# Patient Record
Sex: FEMALE | Race: WHITE | Employment: UNEMPLOYED | URBAN - METROPOLITAN AREA
[De-identification: names, ages, dates, MRNs, and addresses within clinical notes are randomized per-mention and may not be internally consistent; named-entity substitution may affect disease eponyms.]

---

## 2019-05-03 ENCOUNTER — HOSPITAL ENCOUNTER (OUTPATIENT)
Age: 59
Discharge: HOME | End: 2019-05-05
Payer: COMMERCIAL

## 2019-05-03 LAB
BACTERIA: ABNORMAL /HPF
BILIRUBIN URINE: NEGATIVE
BLOOD, URINE: ABNORMAL
CLARITY: CLEAR
COLOR: YELLOW
GLUCOSE URINE: NEGATIVE MG/DL
KETONES, URINE: NEGATIVE MG/DL
LEUKOCYTE ESTERASE, URINE: ABNORMAL
NITRITE, URINE: NEGATIVE
PH UA: 6 (ref 5–9)
PROTEIN UA: NEGATIVE MG/DL
RBC UA: ABNORMAL /HPF (ref 0–2)
SPECIFIC GRAVITY UA: 1.01 (ref 1–1.03)
UROBILINOGEN, URINE: 0.2 E.U./DL
WBC UA: ABNORMAL /HPF (ref 0–5)

## 2019-05-03 PROCEDURE — 87088 URINE BACTERIA CULTURE: CPT

## 2019-05-03 PROCEDURE — 81001 URINALYSIS AUTO W/SCOPE: CPT

## 2019-05-05 LAB — URINE CULTURE, ROUTINE: NORMAL

## 2020-01-27 ENCOUNTER — TELEPHONE (OUTPATIENT)
Dept: ADMINISTRATIVE | Age: 60
End: 2020-01-27

## 2021-03-03 DIAGNOSIS — Z01.818 PRE-OP TESTING: Primary | ICD-10-CM

## 2021-03-05 ENCOUNTER — HOSPITAL ENCOUNTER (OUTPATIENT)
Age: 61
Discharge: HOME | End: 2021-03-07
Payer: COMMERCIAL

## 2021-03-05 DIAGNOSIS — Z01.818 PRE-OP TESTING: ICD-10-CM

## 2021-03-05 PROCEDURE — U0003 INFECTIOUS AGENT DETECTION BY NUCLEIC ACID (DNA OR RNA); SEVERE ACUTE RESPIRATORY SYNDROME CORONAVIRUS 2 (SARS-COV-2) (CORONAVIRUS DISEASE [COVID-19]), AMPLIFIED PROBE TECHNIQUE, MAKING USE OF HIGH THROUGHPUT TECHNOLOGIES AS DESCRIBED BY CMS-2020-01-R: HCPCS

## 2021-03-07 LAB — SARS-COV-2, PCR: NOT DETECTED

## 2021-03-12 ENCOUNTER — HOSPITAL ENCOUNTER (OUTPATIENT)
Dept: ULTRASOUND IMAGING | Age: 61
Discharge: HOME | End: 2021-03-14
Payer: COMMERCIAL

## 2021-03-12 DIAGNOSIS — E04.1 CYST OF THYROID: ICD-10-CM

## 2021-03-12 PROCEDURE — 99212 OFFICE O/P EST SF 10 MIN: CPT | Performed by: RADIOLOGY

## 2021-03-12 PROCEDURE — 10005 FNA BX W/US GDN 1ST LES: CPT | Performed by: RADIOLOGY

## 2021-03-12 PROCEDURE — 88173 CYTOPATH EVAL FNA REPORT: CPT

## 2021-03-12 PROCEDURE — 10005 FNA BX W/US GDN 1ST LES: CPT

## 2021-03-12 PROCEDURE — 88305 TISSUE EXAM BY PATHOLOGIST: CPT

## 2021-03-12 NOTE — H&P
Interventional Radiology  Attending Pre-operative History and Physical    DIAGNOSIS:  There is no problem list on file for this patient. CHIEF COMPLAINT: <principal problem not specified>        No current outpatient medications on file. No Known Allergies    No past medical history on file. No past surgical history on file. No family history on file.     Social History     Socioeconomic History    Marital status:      Spouse name: Not on file    Number of children: Not on file    Years of education: Not on file    Highest education level: Not on file   Occupational History    Not on file   Social Needs    Financial resource strain: Not on file    Food insecurity     Worry: Not on file     Inability: Not on file    Transportation needs     Medical: Not on file     Non-medical: Not on file   Tobacco Use    Smoking status: Not on file   Substance and Sexual Activity    Alcohol use: Not on file    Drug use: Not on file    Sexual activity: Not on file   Lifestyle    Physical activity     Days per week: Not on file     Minutes per session: Not on file    Stress: Not on file   Relationships    Social connections     Talks on phone: Not on file     Gets together: Not on file     Attends Buddhist service: Not on file     Active member of club or organization: Not on file     Attends meetings of clubs or organizations: Not on file     Relationship status: Not on file    Intimate partner violence     Fear of current or ex partner: Not on file     Emotionally abused: Not on file     Physically abused: Not on file     Forced sexual activity: Not on file   Other Topics Concern    Not on file   Social History Narrative    Not on file       ROS: Non-contributory other than as noted above    PHYSICAL EXAM:      Heart and Lungs:  demonstrate no contraindications to proceed    DATA:  CBC: No results found for: WBC, RBC, HGB, HCT, MCV, MCH, MCHC, RDW, PLT, MPV  CBC with Differential:  No results found for: WBC, RBC, HGB, HCT, PLT, MCV, MCH, MCHC, RDW, NRBC, SEGSPCT, BANDSPCT, BLASTSPCT, METASPCT, LYMPHOPCT, PROMYELOPCT, MONOPCT, MYELOPCT, EOSPCT, BASOPCT, MONOSABS, LYMPHSABS, EOSABS, BASOSABS, DIFFTYPE  Platelets:  No results found for: PLT  BUN/Creatinine:  No results found for: BUN, CREATININE    ASSESSMENT AND PLAN:  1. Right thyroid mass plan for aspiration  2. Procedure options, risks and benefits reviewed with patient. Patient expresses understanding.     Electronically signed by Tal Reyna II, MD on 3/12/2021 at 1:48 PM

## 2021-03-12 NOTE — BRIEF OP NOTE
Brief Postoperative Note    Juliette Perez  YOB: 1960  [id number]    Pre-operative Diagnosis: mass   Right thyroid mass plan for aspiration  Post-operative Diagnosis: Same    Procedure: biopsy    Estimated Blood Loss: < 10 cc    Surgeon: Festus BENTON     Complications: none    Specimens obtained: Yes, biopsy of mass    Findings: biopsy of a mass      Tal Reyna II   3/12/2021 1:48 PM

## 2021-04-02 ENCOUNTER — HOSPITAL ENCOUNTER (OUTPATIENT)
Dept: MRI IMAGING | Age: 61
Discharge: HOME | End: 2021-04-04
Payer: COMMERCIAL

## 2021-04-02 DIAGNOSIS — R22.1 NECK MASS: ICD-10-CM

## 2021-04-02 PROCEDURE — 6360000004 HC RX CONTRAST MEDICATION: Performed by: RADIOLOGY

## 2021-04-02 PROCEDURE — A9579 GAD-BASE MR CONTRAST NOS,1ML: HCPCS | Performed by: RADIOLOGY

## 2021-04-02 PROCEDURE — 70543 MRI ORBT/FAC/NCK W/O &W/DYE: CPT

## 2021-04-02 RX ADMIN — GADOTERIDOL 12 ML: 279.3 INJECTION, SOLUTION INTRAVENOUS at 18:01

## 2021-05-24 NOTE — PROGRESS NOTES
Have you been tested for COVID  No       vaccinated    Have you been told you were positive for COVID No  Have you had any known exposure to someone that is positive for COVID No  Do you have a cough                   No              Do you have shortness of breath No                 Do you have a sore throat            No                Are you having chills                    No                Are you having muscle aches. No                    Please come to the hospital wearing a mask and have your significant other wear a mask as well. Both of you should check your temperature before leaving to come here,  if it is 100 or higher please call [phone] for instruction.

## 2021-05-24 NOTE — PROGRESS NOTES
[facility] PRE-ADMISSION TESTING INSTRUCTIONS    Please come to the hospital wearing a mask and have your significant other wear a mask as well. Both of you should check your temperature before leaving to come here,  if it is 100 or higher please call [phone], preop area opens at 0530 a.m.,  for instruction. The Preadmission Testing patient is instructed accordingly using the following criteria (check applicable):    ARRIVAL INSTRUCTIONS:  [x] Parking the day of Surgery is located in the Main Entrance lot. Upon entering the door, make an immediate right to the surgery reception desk    [x] Bring photo ID and insurance card    [] Bring in a copy of Living will or Durable Power of  papers.     [x] Please be sure to arrange for responsible adult to provide transportation to and from the hospital    [x] Please arrange for responsible adult to be with you for the 24 hour period post procedure due to having anesthesia      GENERAL INSTRUCTIONS:    [x] Nothing by mouth after midnight, including gum, candy, mints or water    [x] You may brush your teeth, but do not swallow any water    [] Take medications as instructed with 1-2 oz of water    [] Stop herbal supplements and vitamins 5 days prior to procedure    [] Follow preop dosing of blood thinners per physician instructions    [] Take 1/2 dose of evening insulin, but no insulin after midnight    [] No oral diabetic medications after midnight    [] If diabetic and have low blood sugar or feel symptomatic, take 1-2oz apple juice only    [] Bring inhalers day of surgery    [] Bring C-PAP/ Bi-Pap day of surgery    [] Bring urine specimen day of surgery    [x] Shower or bath with soap, lather and rinse well, AM of Surgery, no lotion, powders or creams to surgical site    [] Follow bowel prep as instructed per surgeon    [x] No tobacco products within 24 hours of surgery     [x] No alcohol or illegal drug use within 24 hours of surgery.     [x] Jewelry, body piercing's, eyeglasses, contact lenses and dentures are not permitted into surgery (bring cases)      [x] Please do not wear any nail polish, make up or hair products on the day of surgery    [x] You can expect a call the business day prior to procedure to notify you if your arrival time changes    [x] If you receive a survey after surgery we would greatly appreciate your comments    [] Parent/guardian of a minor must accompany their child and remain on the premises  the entire time they are under our care     [] Pediatric patients may bring favorite toy, blanket or comfort item with them    [] A caregiver or family member must remain with the patient during their stay if they are mentally handicapped, have dementia, disoriented or unable to use a call light or would be a safety concern if left unattended    [x] Please notify surgeon if you develop any illness between now and time of surgery (cold, cough, sore throat, fever, nausea, vomiting) or any signs of infections  including skin, wounds, and dental.    [x]  The Outpatient Pharmacy is available to fill your prescription here on your day of surgery, ask your preop nurse for details    [] Other instructions    EDUCATIONAL MATERIALS PROVIDED:    [] PAT Preoperative Education Packet/Booklet     [] Medication List    [] Transfusion bracelet applied with instructions    [] Shower with soap, lather and rinse well, and use CHG wipes provided the evening before surgery as instructed    [] Incentive spirometer with instructions

## 2021-05-26 ENCOUNTER — ANESTHESIA EVENT (OUTPATIENT)
Dept: OPERATING ROOM | Age: 61
End: 2021-05-26
Payer: COMMERCIAL

## 2021-05-26 ENCOUNTER — HOSPITAL ENCOUNTER (OUTPATIENT)
Dept: PREADMISSION TESTING | Age: 61
Discharge: HOME | End: 2021-05-26
Payer: COMMERCIAL

## 2021-05-26 LAB
ABO/RH: NORMAL
ALBUMIN SERPL-MCNC: 4.3 G/DL (ref 3.5–5.2)
ALP BLD-CCNC: 87 U/L (ref 35–104)
ALT SERPL-CCNC: 24 U/L (ref 0–32)
ANION GAP SERPL CALCULATED.3IONS-SCNC: 6 MMOL/L (ref 7–16)
ANTIBODY SCREEN: NORMAL
AST SERPL-CCNC: 26 U/L (ref 0–31)
BASOPHILS ABSOLUTE: 0.04 E9/L (ref 0–0.2)
BASOPHILS RELATIVE PERCENT: 0.8 % (ref 0–2)
BILIRUB SERPL-MCNC: 0.6 MG/DL (ref 0–1.2)
BUN BLDV-MCNC: 16 MG/DL (ref 6–23)
CALCIUM SERPL-MCNC: 8.7 MG/DL (ref 8.6–10.2)
CHLORIDE BLD-SCNC: 105 MMOL/L (ref 98–107)
CO2: 29 MMOL/L (ref 22–29)
CREAT SERPL-MCNC: 0.8 MG/DL (ref 0.5–1)
EKG ATRIAL RATE: 55 BPM
EKG P AXIS: 50 DEGREES
EKG P-R INTERVAL: 164 MS
EKG Q-T INTERVAL: 390 MS
EKG QRS DURATION: 80 MS
EKG QTC CALCULATION (BAZETT): 373 MS
EKG R AXIS: 55 DEGREES
EKG T AXIS: 46 DEGREES
EKG VENTRICULAR RATE: 55 BPM
EOSINOPHILS ABSOLUTE: 0.14 E9/L (ref 0.05–0.5)
EOSINOPHILS RELATIVE PERCENT: 2.8 % (ref 0–6)
GFR AFRICAN AMERICAN: >60
GFR NON-AFRICAN AMERICAN: >60 ML/MIN/1.73
GLUCOSE BLD-MCNC: 108 MG/DL (ref 74–99)
HCT VFR BLD CALC: 39.1 % (ref 34–48)
HEMOGLOBIN: 13.5 G/DL (ref 11.5–15.5)
IMMATURE GRANULOCYTES #: 0.02 E9/L
IMMATURE GRANULOCYTES %: 0.4 % (ref 0–5)
LYMPHOCYTES ABSOLUTE: 0.96 E9/L (ref 1.5–4)
LYMPHOCYTES RELATIVE PERCENT: 19.5 % (ref 20–42)
MCH RBC QN AUTO: 32.4 PG (ref 26–35)
MCHC RBC AUTO-ENTMCNC: 34.5 % (ref 32–34.5)
MCV RBC AUTO: 93.8 FL (ref 80–99.9)
MONOCYTES ABSOLUTE: 0.46 E9/L (ref 0.1–0.95)
MONOCYTES RELATIVE PERCENT: 9.3 % (ref 2–12)
NEUTROPHILS ABSOLUTE: 3.31 E9/L (ref 1.8–7.3)
NEUTROPHILS RELATIVE PERCENT: 67.2 % (ref 43–80)
PDW BLD-RTO: 12.3 FL (ref 11.5–15)
PLATELET # BLD: 214 E9/L (ref 130–450)
PMV BLD AUTO: 9 FL (ref 7–12)
POTASSIUM REFLEX MAGNESIUM: 4.5 MMOL/L (ref 3.5–5)
RBC # BLD: 4.17 E12/L (ref 3.5–5.5)
SODIUM BLD-SCNC: 140 MMOL/L (ref 132–146)
TOTAL PROTEIN: 6.2 G/DL (ref 6.4–8.3)
WBC # BLD: 4.9 E9/L (ref 4.5–11.5)

## 2021-05-26 PROCEDURE — 86850 RBC ANTIBODY SCREEN: CPT

## 2021-05-26 PROCEDURE — 36415 COLL VENOUS BLD VENIPUNCTURE: CPT

## 2021-05-26 PROCEDURE — 85025 COMPLETE CBC W/AUTO DIFF WBC: CPT

## 2021-05-26 PROCEDURE — 86900 BLOOD TYPING SEROLOGIC ABO: CPT

## 2021-05-26 PROCEDURE — 80053 COMPREHEN METABOLIC PANEL: CPT

## 2021-05-26 PROCEDURE — 86901 BLOOD TYPING SEROLOGIC RH(D): CPT

## 2021-05-26 PROCEDURE — 93005 ELECTROCARDIOGRAM TRACING: CPT

## 2021-05-27 ENCOUNTER — ANESTHESIA (OUTPATIENT)
Dept: OPERATING ROOM | Age: 61
End: 2021-05-27
Payer: COMMERCIAL

## 2021-05-27 ENCOUNTER — HOSPITAL ENCOUNTER (OUTPATIENT)
Age: 61
Setting detail: OBSERVATION
Discharge: HOME HEALTH | End: 2021-05-28
Attending: OTOLARYNGOLOGY | Admitting: OTOLARYNGOLOGY
Payer: COMMERCIAL

## 2021-05-27 VITALS — DIASTOLIC BLOOD PRESSURE: 99 MMHG | SYSTOLIC BLOOD PRESSURE: 184 MMHG | TEMPERATURE: 94.1 F | OXYGEN SATURATION: 100 %

## 2021-05-27 DIAGNOSIS — E89.0 S/P PARTIAL THYROIDECTOMY: Primary | ICD-10-CM

## 2021-05-27 LAB
CALCIUM IONIZED: 1.22 MMOL/L (ref 1.15–1.33)
CALCIUM IONIZED: 1.25 MMOL/L (ref 1.15–1.33)
CALCIUM SERPL-MCNC: 8.1 MG/DL (ref 8.6–10.2)
CALCIUM SERPL-MCNC: 8.4 MG/DL (ref 8.6–10.2)
PTH, INTRAOPERATIVE: 39.6 PG/ML (ref 15–65)

## 2021-05-27 PROCEDURE — 7100000001 HC PACU RECOVERY - ADDTL 15 MIN: Performed by: OTOLARYNGOLOGY

## 2021-05-27 PROCEDURE — 83970 ASSAY OF PARATHORMONE: CPT

## 2021-05-27 PROCEDURE — G0378 HOSPITAL OBSERVATION PER HR: HCPCS

## 2021-05-27 PROCEDURE — 6360000002 HC RX W HCPCS: Performed by: ANESTHESIOLOGY

## 2021-05-27 PROCEDURE — 3600000013 HC SURGERY LEVEL 3 ADDTL 15MIN: Performed by: OTOLARYNGOLOGY

## 2021-05-27 PROCEDURE — 6360000002 HC RX W HCPCS: Performed by: OTOLARYNGOLOGY

## 2021-05-27 PROCEDURE — 2580000003 HC RX 258: Performed by: NURSE ANESTHETIST, CERTIFIED REGISTERED

## 2021-05-27 PROCEDURE — 6360000002 HC RX W HCPCS: Performed by: NURSE ANESTHETIST, CERTIFIED REGISTERED

## 2021-05-27 PROCEDURE — 3700000000 HC ANESTHESIA ATTENDED CARE: Performed by: OTOLARYNGOLOGY

## 2021-05-27 PROCEDURE — 2500000003 HC RX 250 WO HCPCS: Performed by: OTOLARYNGOLOGY

## 2021-05-27 PROCEDURE — 88307 TISSUE EXAM BY PATHOLOGIST: CPT

## 2021-05-27 PROCEDURE — 36415 COLL VENOUS BLD VENIPUNCTURE: CPT

## 2021-05-27 PROCEDURE — 2580000003 HC RX 258: Performed by: OTOLARYNGOLOGY

## 2021-05-27 PROCEDURE — 6360000002 HC RX W HCPCS: Performed by: STUDENT IN AN ORGANIZED HEALTH CARE EDUCATION/TRAINING PROGRAM

## 2021-05-27 PROCEDURE — 6370000000 HC RX 637 (ALT 250 FOR IP): Performed by: STUDENT IN AN ORGANIZED HEALTH CARE EDUCATION/TRAINING PROGRAM

## 2021-05-27 PROCEDURE — 2500000003 HC RX 250 WO HCPCS: Performed by: NURSE ANESTHETIST, CERTIFIED REGISTERED

## 2021-05-27 PROCEDURE — 2720000010 HC SURG SUPPLY STERILE: Performed by: OTOLARYNGOLOGY

## 2021-05-27 PROCEDURE — 3700000001 HC ADD 15 MINUTES (ANESTHESIA): Performed by: OTOLARYNGOLOGY

## 2021-05-27 PROCEDURE — 2580000003 HC RX 258: Performed by: STUDENT IN AN ORGANIZED HEALTH CARE EDUCATION/TRAINING PROGRAM

## 2021-05-27 PROCEDURE — 2709999900 HC NON-CHARGEABLE SUPPLY: Performed by: OTOLARYNGOLOGY

## 2021-05-27 PROCEDURE — 82330 ASSAY OF CALCIUM: CPT

## 2021-05-27 PROCEDURE — 7100000000 HC PACU RECOVERY - FIRST 15 MIN: Performed by: OTOLARYNGOLOGY

## 2021-05-27 PROCEDURE — 88304 TISSUE EXAM BY PATHOLOGIST: CPT

## 2021-05-27 PROCEDURE — 3600000003 HC SURGERY LEVEL 3 BASE: Performed by: OTOLARYNGOLOGY

## 2021-05-27 PROCEDURE — 88331 PATH CONSLTJ SURG 1 BLK 1SPC: CPT

## 2021-05-27 PROCEDURE — 82310 ASSAY OF CALCIUM: CPT

## 2021-05-27 RX ORDER — MEPERIDINE HYDROCHLORIDE 50 MG/ML
50 INJECTION INTRAMUSCULAR; INTRAVENOUS; SUBCUTANEOUS ONCE
Status: COMPLETED | OUTPATIENT
Start: 2021-05-27 | End: 2021-05-27

## 2021-05-27 RX ORDER — SODIUM CHLORIDE 9 MG/ML
25 INJECTION, SOLUTION INTRAVENOUS PRN
Status: DISCONTINUED | OUTPATIENT
Start: 2021-05-27 | End: 2021-05-27

## 2021-05-27 RX ORDER — LIDOCAINE HYDROCHLORIDE AND EPINEPHRINE 10; 10 MG/ML; UG/ML
INJECTION, SOLUTION INFILTRATION; PERINEURAL PRN
Status: DISCONTINUED | OUTPATIENT
Start: 2021-05-27 | End: 2021-05-27 | Stop reason: HOSPADM

## 2021-05-27 RX ORDER — PROMETHAZINE HYDROCHLORIDE 25 MG/1
12.5 TABLET ORAL EVERY 6 HOURS PRN
Status: DISCONTINUED | OUTPATIENT
Start: 2021-05-27 | End: 2021-05-28 | Stop reason: HOSPADM

## 2021-05-27 RX ORDER — SODIUM CHLORIDE 0.9 % (FLUSH) 0.9 %
5-40 SYRINGE (ML) INJECTION EVERY 12 HOURS SCHEDULED
Status: DISCONTINUED | OUTPATIENT
Start: 2021-05-27 | End: 2021-05-27

## 2021-05-27 RX ORDER — PROPOFOL 10 MG/ML
INJECTION, EMULSION INTRAVENOUS PRN
Status: DISCONTINUED | OUTPATIENT
Start: 2021-05-27 | End: 2021-05-27 | Stop reason: SDUPTHER

## 2021-05-27 RX ORDER — MEPERIDINE HYDROCHLORIDE 50 MG/ML
50 INJECTION INTRAMUSCULAR; INTRAVENOUS; SUBCUTANEOUS ONCE
Status: DISCONTINUED | OUTPATIENT
Start: 2021-05-27 | End: 2021-05-27

## 2021-05-27 RX ORDER — NALOXONE HYDROCHLORIDE 0.4 MG/ML
INJECTION, SOLUTION INTRAMUSCULAR; INTRAVENOUS; SUBCUTANEOUS PRN
Status: DISCONTINUED | OUTPATIENT
Start: 2021-05-27 | End: 2021-05-27 | Stop reason: SDUPTHER

## 2021-05-27 RX ORDER — SODIUM CHLORIDE 0.9 % (FLUSH) 0.9 %
5-40 SYRINGE (ML) INJECTION PRN
Status: DISCONTINUED | OUTPATIENT
Start: 2021-05-27 | End: 2021-05-28 | Stop reason: HOSPADM

## 2021-05-27 RX ORDER — ONDANSETRON 2 MG/ML
4 INJECTION INTRAMUSCULAR; INTRAVENOUS EVERY 6 HOURS PRN
Status: DISCONTINUED | OUTPATIENT
Start: 2021-05-27 | End: 2021-05-28 | Stop reason: HOSPADM

## 2021-05-27 RX ORDER — MIDAZOLAM HYDROCHLORIDE 1 MG/ML
INJECTION INTRAMUSCULAR; INTRAVENOUS PRN
Status: DISCONTINUED | OUTPATIENT
Start: 2021-05-27 | End: 2021-05-27 | Stop reason: SDUPTHER

## 2021-05-27 RX ORDER — PHENYLEPHRINE HCL IN 0.9% NACL 1 MG/10 ML
SYRINGE (ML) INTRAVENOUS PRN
Status: DISCONTINUED | OUTPATIENT
Start: 2021-05-27 | End: 2021-05-27 | Stop reason: SDUPTHER

## 2021-05-27 RX ORDER — SODIUM CHLORIDE 0.9 % (FLUSH) 0.9 %
5-40 SYRINGE (ML) INJECTION EVERY 12 HOURS SCHEDULED
Status: DISCONTINUED | OUTPATIENT
Start: 2021-05-27 | End: 2021-05-28 | Stop reason: HOSPADM

## 2021-05-27 RX ORDER — DEXAMETHASONE SODIUM PHOSPHATE 4 MG/ML
INJECTION, SOLUTION INTRA-ARTICULAR; INTRALESIONAL; INTRAMUSCULAR; INTRAVENOUS; SOFT TISSUE PRN
Status: DISCONTINUED | OUTPATIENT
Start: 2021-05-27 | End: 2021-05-27 | Stop reason: SDUPTHER

## 2021-05-27 RX ORDER — ACETAMINOPHEN 325 MG/1
650 TABLET ORAL EVERY 6 HOURS
Status: DISCONTINUED | OUTPATIENT
Start: 2021-05-27 | End: 2021-05-28 | Stop reason: HOSPADM

## 2021-05-27 RX ORDER — LIDOCAINE HYDROCHLORIDE 20 MG/ML
INJECTION, SOLUTION EPIDURAL; INFILTRATION; INTRACAUDAL; PERINEURAL PRN
Status: DISCONTINUED | OUTPATIENT
Start: 2021-05-27 | End: 2021-05-27 | Stop reason: SDUPTHER

## 2021-05-27 RX ORDER — OXYCODONE HYDROCHLORIDE 5 MG/1
10 TABLET ORAL EVERY 4 HOURS PRN
Status: DISCONTINUED | OUTPATIENT
Start: 2021-05-27 | End: 2021-05-28 | Stop reason: HOSPADM

## 2021-05-27 RX ORDER — FENTANYL CITRATE 50 UG/ML
INJECTION, SOLUTION INTRAMUSCULAR; INTRAVENOUS PRN
Status: DISCONTINUED | OUTPATIENT
Start: 2021-05-27 | End: 2021-05-27 | Stop reason: SDUPTHER

## 2021-05-27 RX ORDER — OXYCODONE HYDROCHLORIDE 5 MG/1
5 TABLET ORAL EVERY 4 HOURS PRN
Status: DISCONTINUED | OUTPATIENT
Start: 2021-05-27 | End: 2021-05-28 | Stop reason: HOSPADM

## 2021-05-27 RX ORDER — SODIUM CHLORIDE, SODIUM LACTATE, POTASSIUM CHLORIDE, CALCIUM CHLORIDE 600; 310; 30; 20 MG/100ML; MG/100ML; MG/100ML; MG/100ML
INJECTION, SOLUTION INTRAVENOUS CONTINUOUS PRN
Status: DISCONTINUED | OUTPATIENT
Start: 2021-05-27 | End: 2021-05-27 | Stop reason: SDUPTHER

## 2021-05-27 RX ORDER — SUCCINYLCHOLINE/SOD CL,ISO/PF 200MG/10ML
SYRINGE (ML) INTRAVENOUS PRN
Status: DISCONTINUED | OUTPATIENT
Start: 2021-05-27 | End: 2021-05-27 | Stop reason: SDUPTHER

## 2021-05-27 RX ORDER — SODIUM CHLORIDE 9 MG/ML
25 INJECTION, SOLUTION INTRAVENOUS PRN
Status: DISCONTINUED | OUTPATIENT
Start: 2021-05-27 | End: 2021-05-28 | Stop reason: HOSPADM

## 2021-05-27 RX ORDER — MEPERIDINE HYDROCHLORIDE 25 MG/ML
12.5 INJECTION INTRAMUSCULAR; INTRAVENOUS; SUBCUTANEOUS EVERY 5 MIN PRN
Status: DISCONTINUED | OUTPATIENT
Start: 2021-05-27 | End: 2021-05-27

## 2021-05-27 RX ORDER — SODIUM CHLORIDE 0.9 % (FLUSH) 0.9 %
5-40 SYRINGE (ML) INJECTION PRN
Status: DISCONTINUED | OUTPATIENT
Start: 2021-05-27 | End: 2021-05-27

## 2021-05-27 RX ORDER — ONDANSETRON 2 MG/ML
INJECTION INTRAMUSCULAR; INTRAVENOUS PRN
Status: DISCONTINUED | OUTPATIENT
Start: 2021-05-27 | End: 2021-05-27 | Stop reason: SDUPTHER

## 2021-05-27 RX ORDER — ONDANSETRON 2 MG/ML
4 INJECTION INTRAMUSCULAR; INTRAVENOUS
Status: DISCONTINUED | OUTPATIENT
Start: 2021-05-27 | End: 2021-05-27

## 2021-05-27 RX ORDER — SODIUM CHLORIDE, SODIUM LACTATE, POTASSIUM CHLORIDE, CALCIUM CHLORIDE 600; 310; 30; 20 MG/100ML; MG/100ML; MG/100ML; MG/100ML
INJECTION, SOLUTION INTRAVENOUS CONTINUOUS
Status: DISCONTINUED | OUTPATIENT
Start: 2021-05-27 | End: 2021-05-27

## 2021-05-27 RX ADMIN — NALOXONE HYDROCHLORIDE 0.12 MG: 0.4 INJECTION, SOLUTION INTRAMUSCULAR; INTRAVENOUS; SUBCUTANEOUS at 10:31

## 2021-05-27 RX ADMIN — FENTANYL CITRATE 50 MCG: 50 INJECTION, SOLUTION INTRAMUSCULAR; INTRAVENOUS at 08:38

## 2021-05-27 RX ADMIN — ONDANSETRON 4 MG: 2 INJECTION INTRAMUSCULAR; INTRAVENOUS at 10:22

## 2021-05-27 RX ADMIN — ACETAMINOPHEN 650 MG: 325 TABLET ORAL at 14:05

## 2021-05-27 RX ADMIN — MEPERIDINE HYDROCHLORIDE 50 MG: 50 INJECTION, SOLUTION INTRAMUSCULAR; INTRAVENOUS; SUBCUTANEOUS at 11:20

## 2021-05-27 RX ADMIN — Medication 100 MCG: at 08:46

## 2021-05-27 RX ADMIN — HYDROMORPHONE HYDROCHLORIDE 0.5 MG: 1 INJECTION, SOLUTION INTRAMUSCULAR; INTRAVENOUS; SUBCUTANEOUS at 10:52

## 2021-05-27 RX ADMIN — SODIUM CHLORIDE, PRESERVATIVE FREE 10 ML: 5 INJECTION INTRAVENOUS at 20:12

## 2021-05-27 RX ADMIN — MIDAZOLAM 2 MG: 1 INJECTION INTRAMUSCULAR; INTRAVENOUS at 08:24

## 2021-05-27 RX ADMIN — PROPOFOL 200 MG: 10 INJECTION, EMULSION INTRAVENOUS at 08:26

## 2021-05-27 RX ADMIN — HYDROMORPHONE HYDROCHLORIDE 0.5 MG: 1 INJECTION, SOLUTION INTRAMUSCULAR; INTRAVENOUS; SUBCUTANEOUS at 11:00

## 2021-05-27 RX ADMIN — Medication 2000 MG: at 15:24

## 2021-05-27 RX ADMIN — DEXAMETHASONE SODIUM PHOSPHATE 8 MG: 4 INJECTION, SOLUTION INTRAMUSCULAR; INTRAVENOUS at 08:32

## 2021-05-27 RX ADMIN — Medication 100 MCG: at 08:44

## 2021-05-27 RX ADMIN — FENTANYL CITRATE 50 MCG: 50 INJECTION, SOLUTION INTRAMUSCULAR; INTRAVENOUS at 09:26

## 2021-05-27 RX ADMIN — ACETAMINOPHEN 650 MG: 325 TABLET ORAL at 18:03

## 2021-05-27 RX ADMIN — Medication 2000 MG: at 08:37

## 2021-05-27 RX ADMIN — SODIUM CHLORIDE, POTASSIUM CHLORIDE, SODIUM LACTATE AND CALCIUM CHLORIDE: 600; 310; 30; 20 INJECTION, SOLUTION INTRAVENOUS at 09:17

## 2021-05-27 RX ADMIN — SODIUM CHLORIDE, POTASSIUM CHLORIDE, SODIUM LACTATE AND CALCIUM CHLORIDE: 600; 310; 30; 20 INJECTION, SOLUTION INTRAVENOUS at 08:22

## 2021-05-27 RX ADMIN — LIDOCAINE HYDROCHLORIDE 100 MG: 20 INJECTION, SOLUTION EPIDURAL; INFILTRATION; INTRACAUDAL; PERINEURAL at 08:26

## 2021-05-27 RX ADMIN — FENTANYL CITRATE 100 MCG: 50 INJECTION, SOLUTION INTRAMUSCULAR; INTRAVENOUS at 08:26

## 2021-05-27 RX ADMIN — Medication 100 MG: at 08:26

## 2021-05-27 RX ADMIN — SODIUM CHLORIDE, POTASSIUM CHLORIDE, SODIUM LACTATE AND CALCIUM CHLORIDE: 600; 310; 30; 20 INJECTION, SOLUTION INTRAVENOUS at 10:49

## 2021-05-27 RX ADMIN — Medication 200 MCG: at 08:50

## 2021-05-27 ASSESSMENT — PULMONARY FUNCTION TESTS
PIF_VALUE: 14
PIF_VALUE: 30
PIF_VALUE: 15
PIF_VALUE: 16
PIF_VALUE: 15
PIF_VALUE: 16
PIF_VALUE: 11
PIF_VALUE: 15
PIF_VALUE: 10
PIF_VALUE: 15
PIF_VALUE: 18
PIF_VALUE: 13
PIF_VALUE: 15
PIF_VALUE: 10
PIF_VALUE: 1
PIF_VALUE: 1
PIF_VALUE: 13
PIF_VALUE: 14
PIF_VALUE: 0
PIF_VALUE: 19
PIF_VALUE: 14
PIF_VALUE: 15
PIF_VALUE: 15
PIF_VALUE: 14
PIF_VALUE: 13
PIF_VALUE: 14
PIF_VALUE: 4
PIF_VALUE: 14
PIF_VALUE: 4
PIF_VALUE: 10
PIF_VALUE: 30
PIF_VALUE: 14
PIF_VALUE: 14
PIF_VALUE: 13
PIF_VALUE: 13
PIF_VALUE: 14
PIF_VALUE: 17
PIF_VALUE: 14
PIF_VALUE: 15
PIF_VALUE: 16
PIF_VALUE: 14
PIF_VALUE: 14
PIF_VALUE: 16
PIF_VALUE: 15
PIF_VALUE: 14
PIF_VALUE: 14
PIF_VALUE: 16
PIF_VALUE: 13
PIF_VALUE: 14
PIF_VALUE: 13
PIF_VALUE: 15
PIF_VALUE: 15
PIF_VALUE: 14
PIF_VALUE: 13
PIF_VALUE: 15
PIF_VALUE: 16
PIF_VALUE: 14
PIF_VALUE: 15
PIF_VALUE: 14
PIF_VALUE: 15
PIF_VALUE: 18
PIF_VALUE: 15
PIF_VALUE: 16
PIF_VALUE: 14
PIF_VALUE: 14
PIF_VALUE: 15
PIF_VALUE: 16

## 2021-05-27 ASSESSMENT — PAIN DESCRIPTION - PAIN TYPE
TYPE: SURGICAL PAIN

## 2021-05-27 ASSESSMENT — PAIN SCALES - GENERAL
PAINLEVEL_OUTOF10: 2
PAINLEVEL_OUTOF10: 8
PAINLEVEL_OUTOF10: 1
PAINLEVEL_OUTOF10: 7
PAINLEVEL_OUTOF10: 4
PAINLEVEL_OUTOF10: 6
PAINLEVEL_OUTOF10: 1
PAINLEVEL_OUTOF10: 5
PAINLEVEL_OUTOF10: 5
PAINLEVEL_OUTOF10: 6
PAINLEVEL_OUTOF10: 8

## 2021-05-27 ASSESSMENT — PAIN DESCRIPTION - LOCATION
LOCATION: NECK

## 2021-05-27 ASSESSMENT — PAIN - FUNCTIONAL ASSESSMENT: PAIN_FUNCTIONAL_ASSESSMENT: 0-10

## 2021-05-27 NOTE — H&P
H&P reviewed, no changes. No issues. Questions and concerns were answered to the patient's satisfaction.  Will proceed with procedure    Will discuss with attending     Electronically signed by Presley Paget, DO on 5/27/21 at 6:52 AM EDT

## 2021-05-27 NOTE — PROGRESS NOTES
INTRAOPERATIVE CONSULTATION (with FROZEN SECTION)    A. 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 Defer to permanent for subtyping. B. Right neck cyst, representative sections for frozen section:  Scar tissue (possible collapsed cyst wall) containing benign-appearing parathyroid tissue.

## 2021-05-27 NOTE — H&P
The H&P has been reviewed, the patient has been examined, and I concur with the findings of the 4/30/2021 H&P.

## 2021-05-27 NOTE — PLAN OF CARE
Problem: Pain:  Goal: Pain level will decrease  Description: Pain level will decrease  Outcome: Ongoing  Goal: Control of acute pain  Description: Control of acute pain  Outcome: Ongoing  Goal: Control of chronic pain  Description: Control of chronic pain  Outcome: Ongoing     Problem: Discharge Planning:  Goal: Participates in care planning  Description: Participates in care planning  Outcome: Ongoing  Goal: Discharged to appropriate level of care  Description: Discharged to appropriate level of care  Outcome: Ongoing     Problem: Activity Intolerance:  Goal: Ability to tolerate increased activity will improve  Description: Ability to tolerate increased activity will improve  Outcome: Ongoing     Problem: Anxiety/Stress:  Goal: Level of anxiety will decrease  Description: Level of anxiety will decrease  Outcome: Ongoing     Problem:  Bowel Function - Altered:  Goal: Bowel elimination is within specified parameters  Description: Bowel elimination is within specified parameters  Outcome: Ongoing     Problem: Fluid Volume - Deficit:  Goal: Absence of fluid volume deficit signs and symptoms  Description: Absence of fluid volume deficit signs and symptoms  Outcome: Ongoing  Goal: Electrolytes within specified parameters  Description: Electrolytes within specified parameters  Outcome: Ongoing     Problem: Mental Status - Impaired:  Goal: Absence of continued neurological deterioration signs and symptoms  Description: Absence of continued neurological deterioration signs and symptoms  Outcome: Ongoing  Goal: Absence of physical injury  Description: Absence of physical injury  Outcome: Ongoing  Goal: Mental status will be restored to baseline  Description: Mental status will be restored to baseline  Outcome: Ongoing     Problem: Mobility - Impaired:  Goal: Mobility will improve to maximum level  Description: Mobility will improve to maximum level  Outcome: Ongoing     Problem: Nutrition Deficit:  Goal: Ability to achieve

## 2021-05-27 NOTE — ANESTHESIA POSTPROCEDURE EVALUATION
Department of Anesthesiology  Postprocedure Note    Patient: Josselyn Holder  MRN: [id number]  YOB: 1960  Date of evaluation: 5/27/2021  Time:  5:35 PM     Procedure Summary     Date: 05/27/21 Room / Location: [facility] 01 / 106 [facility]    Anesthesia Start: 3153 Anesthesia Stop: 4304    Procedure: RIGHT SUBTOTAL THYROIDECTOMY (N/A Neck) Diagnosis: (THYROID NODULE)    Surgeons: Sophy Bull MD Responsible Provider: Elieser Aceves MD    Anesthesia Type: general ASA Status: 2          Anesthesia Type: general    Jarett Phase I: Jarett Score: 10    Jarett Phase II:      Last vitals: Reviewed and per EMR flowsheets.        Anesthesia Post Evaluation    Patient location during evaluation: PACU  Patient participation: complete - patient participated  Level of consciousness: awake and alert  Airway patency: patent  Nausea & Vomiting: no nausea and no vomiting  Complications: no  Cardiovascular status: hemodynamically stable  Respiratory status: acceptable  Hydration status: stable

## 2021-05-27 NOTE — ANESTHESIA PRE PROCEDURE
Department of Anesthesiology  Preprocedure Note       Name:  Jeannie Mac   Age:  64 y.o.  :  1960                                          MRN:  [id number]         Date:  2021      Surgeon: Carloz Holt):  Alexia Briceno MD    Procedure: Procedure(s):  RIGHT SUBTOTAL THYROIDECTOMY POSSIBLE TOTAL THYROIDECTOMY    ++NEEDS NERVE INTEGRITY MONITOR++    Medications prior to admission:   Prior to Admission medications    Not on File       Current medications:    Current Facility-Administered Medications   Medication Dose Route Frequency Provider Last Rate Last Admin    lactated ringers infusion   Intravenous Continuous Alexia Briceno MD        sodium chloride flush 0.9 % injection 5-40 mL  5-40 mL Intravenous 2 times per day Alexia Briceno MD        sodium chloride flush 0.9 % injection 5-40 mL  5-40 mL Intravenous PRN Alexia Briceno MD        0.9 % sodium chloride infusion  25 mL Intravenous PRN Alexia Briceno MD        ceFAZolin (ANCEF) 2000 mg in sterile water 20 mL IV syringe  2,000 mg Intravenous On Call to [address] [facility] [address] [place]           Allergies:  No Known Allergies    Problem List:  There is no problem list on file for this patient.       Past Medical History:        Diagnosis Date    Abnormal thyroid biopsy     History of diarrhea     Ovarian cancer ([facility] [address]     tx with chemo and radiation    Wears contact lenses     Wears glasses     Wears partial dentures     upper and lower       Past Surgical History:        Procedure Laterality Date    TONSILLECTOMY         Social History:    Social History     Tobacco Use    Smoking status: Former Smoker     Years: 5.00     Types: Cigarettes    Smokeless tobacco: Never Used   Substance Use Topics    Alcohol use: Yes     Comment: occassionally wine                                Counseling given: Not Answered      Vital Signs (Current):   Vitals:    21 1546 21 0651   BP:  (!) 152/72   Pulse:  63 Resp:  18   SpO2:  98%   Weight: 130 lb (59 kg) 130 lb (59 kg)   Height: 5' 5\" (1.651 m) 5' 5\" (1.651 m)                                              BP Readings from Last 3 Encounters:   05/27/21 (!) 152/72       NPO Status: Time of last liquid consumption: 2330                        Time of last solid consumption: 2330                        Date of last liquid consumption: 05/26/21                        Date of last solid food consumption: 05/26/21    BMI:   Wt Readings from Last 3 Encounters:   05/27/21 130 lb (59 kg)     Body mass index is 21.63 kg/m². CBC:   Lab Results   Component Value Date    WBC 4.9 05/26/2021    RBC 4.17 05/26/2021    HGB 13.5 05/26/2021    HCT 39.1 05/26/2021    MCV 93.8 05/26/2021    RDW 12.3 05/26/2021     05/26/2021       CMP:   Lab Results   Component Value Date     05/26/2021    K 4.5 05/26/2021     05/26/2021    CO2 29 05/26/2021    BUN 16 05/26/2021    CREATININE 0.8 05/26/2021    GFRAA >60 05/26/2021    LABGLOM >60 05/26/2021    GLUCOSE 108 05/26/2021    PROT 6.2 05/26/2021    CALCIUM 8.7 05/26/2021    BILITOT 0.6 05/26/2021    ALKPHOS 87 05/26/2021    AST 26 05/26/2021    ALT 24 05/26/2021       POC Tests: No results for input(s): POCGLU, POCNA, POCK, POCCL, POCBUN, POCHEMO, POCHCT in the last 72 hours.     Coags: No results found for: PROTIME, INR, APTT    HCG (If Applicable): No results found for: PREGTESTUR, PREGSERUM, HCG, HCGQUANT     ABGs: No results found for: PHART, PO2ART, UAH3ARG, CLK3HLY, BEART, Y5LDEGDA     Type & Screen (If Applicable):  No results found for: LABABO, LABRH    Drug/Infectious Status (If Applicable):  No results found for: HIV, HEPCAB    COVID-19 Screening (If Applicable):   Lab Results   Component Value Date    COVID19 Not Detected 03/05/2021           Anesthesia Evaluation  Patient summary reviewed  Airway: Mallampati: II  TM distance: >3 FB   Neck ROM: full  Mouth opening: > = 3 FB Dental:    (+) partials      Pulmonary:

## 2021-05-28 VITALS
RESPIRATION RATE: 18 BRPM | DIASTOLIC BLOOD PRESSURE: 64 MMHG | HEART RATE: 64 BPM | WEIGHT: 130 LBS | SYSTOLIC BLOOD PRESSURE: 134 MMHG | BODY MASS INDEX: 21.66 KG/M2 | HEIGHT: 65 IN | TEMPERATURE: 97.3 F | OXYGEN SATURATION: 99 %

## 2021-05-28 LAB
CALCIUM IONIZED: 1.18 MMOL/L (ref 1.15–1.33)
CALCIUM IONIZED: 1.19 MMOL/L (ref 1.15–1.33)
CALCIUM SERPL-MCNC: 8.4 MG/DL (ref 8.6–10.2)
CALCIUM SERPL-MCNC: 8.8 MG/DL (ref 8.6–10.2)

## 2021-05-28 PROCEDURE — 82330 ASSAY OF CALCIUM: CPT

## 2021-05-28 PROCEDURE — 6370000000 HC RX 637 (ALT 250 FOR IP): Performed by: STUDENT IN AN ORGANIZED HEALTH CARE EDUCATION/TRAINING PROGRAM

## 2021-05-28 PROCEDURE — 6360000002 HC RX W HCPCS: Performed by: STUDENT IN AN ORGANIZED HEALTH CARE EDUCATION/TRAINING PROGRAM

## 2021-05-28 PROCEDURE — G0378 HOSPITAL OBSERVATION PER HR: HCPCS

## 2021-05-28 PROCEDURE — 36415 COLL VENOUS BLD VENIPUNCTURE: CPT

## 2021-05-28 PROCEDURE — 82310 ASSAY OF CALCIUM: CPT

## 2021-05-28 RX ORDER — CEPHALEXIN 500 MG/1
500 CAPSULE ORAL 4 TIMES DAILY
Qty: 20 CAPSULE | Refills: 0 | Status: SHIPPED | OUTPATIENT
Start: 2021-05-28 | End: 2021-06-02

## 2021-05-28 RX ORDER — HYDROCODONE BITARTRATE AND ACETAMINOPHEN 5; 325 MG/1; MG/1
1 TABLET ORAL EVERY 4 HOURS PRN
Qty: 12 TABLET | Refills: 0 | Status: SHIPPED | OUTPATIENT
Start: 2021-05-28 | End: 2021-05-31

## 2021-05-28 RX ORDER — ONDANSETRON 4 MG/1
4 TABLET, ORALLY DISINTEGRATING ORAL EVERY 8 HOURS PRN
Qty: 10 TABLET | Refills: 0 | Status: SHIPPED | OUTPATIENT
Start: 2021-05-28 | End: 2021-05-31

## 2021-05-28 RX ADMIN — Medication 2000 MG: at 00:04

## 2021-05-28 RX ADMIN — ACETAMINOPHEN 650 MG: 325 TABLET ORAL at 06:03

## 2021-05-28 RX ADMIN — ACETAMINOPHEN 650 MG: 325 TABLET ORAL at 00:04

## 2021-05-28 ASSESSMENT — PAIN DESCRIPTION - LOCATION
LOCATION: NECK

## 2021-05-28 ASSESSMENT — PAIN DESCRIPTION - PROGRESSION
CLINICAL_PROGRESSION: GRADUALLY WORSENING
CLINICAL_PROGRESSION: GRADUALLY IMPROVING
CLINICAL_PROGRESSION: GRADUALLY WORSENING

## 2021-05-28 ASSESSMENT — PAIN SCALES - GENERAL
PAINLEVEL_OUTOF10: 2
PAINLEVEL_OUTOF10: 2
PAINLEVEL_OUTOF10: 1

## 2021-05-28 NOTE — OP NOTE
[address] [address]                                OPERATIVE REPORT    PATIENT NAME: Chad Richardson                      :        1960  MED REC NO:   [id number]                            ROOM:       6331  ACCOUNT NO:   [de-identified]                           ADMIT DATE: 2021  PROVIDER:     Edgar Alex    DATE OF PROCEDURE:  2021    PREOPERATIVE DIAGNOSES:  1. Right thyroid nodule. 2.  Right neck cyst like lesion. POSTOPERATIVE DIAGNOSES:  1. Right thyroid nodule. 2.  Right neck cyst like lesion. PROCEDURE PERFORMED:  1. Right thyroidectomy, thyroid lobectomy with isthmusectomy. 2.  Removal of right neck lesion. 3.  Nerve integrity monitoring. SURGEON:  Zion Mckeon MD.    ASSISTANT:  Dr. Edgar Alex. ANESTHESIA:  General.    ESTIMATED BLOOD LOSS:  Less than 20 mL. COMPLICATIONS:  None. SPECIMENS:  1. Right thyroid lobe. 2.  Right neck lesion. BRIEF MEDICAL HISTORY:  A 22-year-old female who had undergone prior  evaluation for enlarging neck mass. This was biopsied and an MRI was  performed that showed a cyst-like structure on the inferior portion of  the thyroid gland. The FNA was inconclusive. All benefits, risks,  alternatives, and questions were answered to the patient's satisfaction  prior to her being consented for the abovementioned procedure. DESCRIPTION OF PROCEDURE:  The patient was seen and examined in the  preoperative holding area by Dr. Hyun Brody. All of her questions and  concerns were answered to her satisfaction, brought back into the OR  under the care of the Anesthesia team, placed in the supine position,  and underwent general anesthesia with a nerve integrity monitor and a  GlideScope in satisfactory position without complications. SCDs were  placed and functioning. Perioperative antibiotics were administered.   A  timeout was performed in which the patient's name, date of birth, and  procedure were confirmed by all parties present. A 10 mL of 1%  lidocaine with epinephrine was administered two fingerbreadths below the  sternal notch and allowed to work. Prepped and draped in normal sterile  fashion. A curvilinear incision was then made two fingerbreadths above the  sternal notch along the relaxed skin tension line. Dissection was then  carried down through the subcutaneous tissue as well as the platysma. Subplatysmal flaps were then raised cephalically and caudally. Midline  raphe was identified and a portion of the strap muscles was dissected to  allow for mobilization of the thyroid gland. The right thyroid gland  was then able to be medialized and the tissues were then freed using  blunt dissection along the lateral and inferior portions. There was a  black cyst-like structure along the inferior portion of the gland  extending down along the course of the carotid as well as recurrent  laryngeal nerve. This was separate from the thyroid gland. The superior pole vessels of the thyroid were able to  be isolated and ligated using a Harmonic as well as 2-0 surgical ties. Appeared to be a nodularity on the superior portion that was  incorporated within the specimen as well that appeared to be a pyramidal  thyroid tissue. The inferior pole vessels were then also isolated and  ligated in the similar fashion. Prior to any excision of tissue, the  nerve integrity monitor was used to confirm that there was no presence  of recurrent laryngeal nerve. Isthmusectomy was then performed using  Harmonic as well as a surgical tie and the remaining lateral portion of  the thyroid was then bluntly dissected carefully to Hunt's ligament and  the nerve integrity monitor was used to aid in identification of the  recurrent laryngeal nerve. The thyroid was able to be freed from the  tissue bed and sent for frozen pathology.   Appeared to be a follicular

## 2021-05-28 NOTE — PROGRESS NOTES
Went over patients discharge AVS. Discussed patients new medications and follow up appointments. Patient waiting for ride and will soon be discharged.

## 2021-05-28 NOTE — PROGRESS NOTES
OTOLARYNGOLOGY  DAILY PROGRESS NOTE  2021    Subjective:     Patient is doing well today. Tolerating  diet. Afebrile over night. No issues overnight. Labs  Questions answered. Objective:     /64   Pulse 64   Temp 97.3 °F (36.3 °C) (Temporal)   Resp 18   Ht 5' 5\" (1.651 m)   Wt 130 lb (59 kg)   SpO2 99%   BMI 21.63 kg/m²   PULSE OXIMETRY RANGE: SpO2  Av.1 %  Min: 79 %  Max: 100 %  I/O last 3 completed shifts: In: 2700 [I.V.:2700]  Out: 305 [Urine:250; Drains:45; Blood:10]    GENERAL:  Laying in bed, awake, alert, cooperative, no apparent distress  HENT: Normocephalic, no nasal drainage, mucous membranes are pink and mouist   NEURO: CN II-XII intact, no hoarseness or change in voice   NECK: Incision is clean, dry, and intact, drain with 10 cc serosanguinous ouput  EYES: No sclera icterus, pupils equal  LUNGS:  No increased work of breathing, no respiratory distress or stridor   CARDIOVASCULAR: Normal rate     CBC  Recent Labs     21  0830   WBC 4.9   HGB 13.5   HCT 39.1        BMP  Recent Labs     21  0830 21  0010     --    K 4.5  --      --    CO2 29  --    BUN 16  --    CREATININE 0.8  --    CALCIUM 8.7 8.8     PT-INR  No results for input(s): INR, PTT in the last 72 hours.     Invalid input(s): PT  CALCIUM  Recent Labs     21  1041 21  1750 21  0010   CALCIUM 8.1* 8.4* 8.8       Assessment/Plan:     64 y.o. female POD #1 s/p right thyroidectomy      - pathology pending, further recs to follow in clinic  Labs within normal limits  General diet as tolerated  Pain and nausea control PRN  Ambulate and out of bed   SCDs   Drain pulled at bedside    Likely home today    Will discuss with attending    Electronically signed by Tobias Daniel DO on 2021 at 6:54 AM

## 2021-05-28 NOTE — PLAN OF CARE
Problem: Pain:  Goal: Pain level will decrease  Description: Pain level will decrease  5/28/2021 0103 by Alicia Valentin RN  Outcome: Met This Shift     Problem: Pain:  Goal: Control of acute pain  Description: Control of acute pain  5/28/2021 0103 by Alicia Valentin RN  Outcome: Met This Shift     Problem: Pain:  Goal: Pain level will decrease  Description: Pain level will decrease  5/28/2021 0103 by Alicia Valentin RN  Outcome: Met This Shift     Problem: Skin Integrity - Impaired:  Goal: Will show no infection signs and symptoms  Description: Will show no infection signs and symptoms  5/28/2021 0103 by Alicia Valentin RN  Outcome: Met This Shift     Problem: Skin Integrity - Impaired:  Goal: Absence of new skin breakdown  Description: Absence of new skin breakdown  5/28/2021 0103 by Alicia Valentin RN  Outcome: Met This Shift

## 2021-05-28 NOTE — DISCHARGE SUMMARY
Physician Discharge Summary     Kimo Philip  [id number]    Admit date: 5/27/2021    Discharge date and time: 5/28/21    Admitting Physician: Bj Katz MD     Admission Diagnoses:   Patient Active Problem List   Diagnosis    S/P partial thyroidectomy       Discharge Diagnoses:   Patient Active Problem List   Diagnosis    S/P partial thyroidectomy         Hospital Course: Kimo Philip is a 64 y.o. female who ENT performed right thyroidectomy. She had an otherwise uneventful course and progressed well. Pain was controlled. She was tolerating a regular diet with no nausea or vomiting, was ambulating well, and was in a suitable condition for discharge to home. Lab Results   Component Value Date    WBC 4.9 05/26/2021    HGB 13.5 05/26/2021     05/26/2021     05/26/2021     05/26/2021    K 4.5 05/26/2021    BUN 16 05/26/2021    CREATININE 0.8 05/26/2021    GLUCOSE 108 05/26/2021    LABGLOM >60 05/26/2021    LABALBU 4.3 05/26/2021    PROT 6.2 05/26/2021    CALCIUM 8.8 05/28/2021    BILITOT 0.6 05/26/2021    ALKPHOS 87 05/26/2021    AST 26 05/26/2021    ALT 24 05/26/2021       Discharge Exam:   VITALS: /64   Pulse 64   Temp 97.3 °F (36.3 °C) (Temporal)   Resp 18   Ht 5' 5\" (1.651 m)   Wt 130 lb (59 kg)   SpO2 99%   BMI 21.63 kg/m²     General appearance: alert, appears stated age and cooperative  Head: Normocephalic, without obvious abnormality, atraumatic  Neck: Incision is clean, dry, intact. No evidence of hematoma or infection.     Lungs: clear to auscultation bilaterally  Heart: regular rate and rhythm  Abdomen: soft, non-tender; bowel sounds normal; no masses,  no organomegaly  Extremities: extremities normal, atraumatic, no cyanosis or edema    Disposition: home       Medication List      START taking these medications    cephALEXin 500 MG capsule  Commonly known as: KEFLEX  Take 1 capsule by mouth 4 times daily for 5 days     HYDROcodone-acetaminophen 5-325 MG per tablet  Commonly known as: Norco  Take 1 tablet by mouth every 4 hours as needed for Pain for up to 3 days. Intended supply: 3 days. Take lowest dose possible to manage pain     ondansetron 4 MG disintegrating tablet  Commonly known as: Zofran ODT  Take 1 tablet by mouth every 8 hours as needed for Nausea or Vomiting           Where to Get Your Medications      These medications were sent to [facility] [address] [place] [address]    Phone: [phone]   · cephALEXin 500 MG capsule  · HYDROcodone-acetaminophen 5-325 MG per tablet  · ondansetron 4 MG disintegrating tablet         Patient Instructions: Activity: no strenuous activity until cleared by physician at post-op appointment   Diet: regular diet  Wound Care: If incision is open to air, okay to use antibiotic ointment. If there are Steri-Strips, there is we will follow-up in 1 week. Do not soak incision, okay to shower 48 hours after surgery. Follow-up with Dr. Tai Lara in 1 week          .     Signed:  Lian Luke DO  5/28/2021  6:55 AM

## 2021-05-28 NOTE — PROGRESS NOTES
Pt is AAO X 4, remains hemodynamically stable. Pt rates pain minimal; tylenol scheduled given. Calcium and Calcium ion drawn Q 6 hours; se  Results reviews. Pt voided. Will continue to monitor patient.

## 2021-06-13 PROBLEM — C73 PAPILLARY CARCINOMA, FOLLICULAR VARIANT (HCC): Status: ACTIVE | Noted: 2021-06-13

## 2021-12-06 ENCOUNTER — HOSPITAL ENCOUNTER (OUTPATIENT)
Age: 61
Discharge: HOME | End: 2021-12-08

## 2021-12-06 PROCEDURE — 88304 TISSUE EXAM BY PATHOLOGIST: CPT

## 2022-03-28 ENCOUNTER — TELEPHONE (OUTPATIENT)
Dept: ENDOCRINOLOGY | Age: 62
End: 2022-03-28

## 2022-03-28 NOTE — TELEPHONE ENCOUNTER
New pt, ref by: Dayana Phillips, Dx: Suppression Thyroid Medication? .  Scheduled 5/23/22. Patient requesting to be seen sooner.  Please advise [phone]

## 2022-05-23 ENCOUNTER — OFFICE VISIT (OUTPATIENT)
Dept: ENDOCRINOLOGY | Age: 62
End: 2022-05-23
Payer: COMMERCIAL

## 2022-05-23 VITALS
DIASTOLIC BLOOD PRESSURE: 68 MMHG | HEIGHT: 65 IN | SYSTOLIC BLOOD PRESSURE: 132 MMHG | WEIGHT: 132 LBS | BODY MASS INDEX: 21.99 KG/M2 | HEART RATE: 58 BPM

## 2022-05-23 DIAGNOSIS — C73 THYROID CANCER (HCC): Primary | ICD-10-CM

## 2022-05-23 DIAGNOSIS — E03.8 SUBCLINICAL HYPOTHYROIDISM: ICD-10-CM

## 2022-05-23 PROCEDURE — 99204 OFFICE O/P NEW MOD 45 MIN: CPT | Performed by: INTERNAL MEDICINE

## 2022-05-23 RX ORDER — MULTIVIT-MIN/IRON/FOLIC ACID/K 18-600-40
CAPSULE ORAL
COMMUNITY
Start: 2021-12-01

## 2022-05-23 NOTE — PROGRESS NOTES
[address] [place] [address] Department of Endocrinology Diabetes and Metabolism   [address] [facility] [address]   Phone: [phone]  Fax: [phone]    Date of Service: 5/23/2022  Primary Care Physician: Agatha Potter DO  Referring physician: Jorge Covarrubias., *  Provider: David Quiñones MD        Reason for the visit:  Primary Hypothyroidism    History of Present Illness: The history is provided by the patient. No  was used. Accuracy of the patient data is excellent. Frederic Villegas is a very pleasant 58 y.o. 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 No family history of thyroid cancer. No prior history of radiation to head or neck region. PAST MEDICAL HISTORY   Past Medical History:   Diagnosis Date    Abnormal thyroid biopsy     History of diarrhea     Ovarian cancer ([facility] [address]     tx with chemo and radiation    Wears contact lenses     Wears glasses     Wears partial dentures     upper and lower       PAST SURGICAL HISTORY   Past Surgical History:   Procedure Laterality Date    THYROIDECTOMY N/A 5/27/2021    RIGHT SUBTOTAL THYROIDECTOMY performed by Kain Albrecht MD at [address] [place] [address]   Tobacco:   reports that she has quit smoking. Her smoking use included cigarettes. She quit after 5.00 years of use. She has never used smokeless tobacco.  Alcohol:   reports current alcohol use. Drugs:   reports no history of drug use. FAMILY HISTORY   No family history on file.     ALLERGIES AND DRUG REACTIONS   No Known Allergies    CURRENT MEDICATIONS   Current Outpatient Medications   Medication Sig Dispense Refill    ascorbic acid (VITAMIN C) 100 MG tablet Vitamin C Active Daily December 1st, 2021 2:19pm      B Complex Vitamins (VITAMIN B COMPLEX PO) Vitamin B Complex Active Daily December 1st, 2021 2:19pm      Cholecalciferol (VITAMIN D) 50 MCG (2000 UT) CAPS capsule Vitamin D Active Daily December 1st, 2021 2:19pm      CALCIUM PO Calcium Active Daily December 1st, 2021 2:19pm      Zinc Sulfate (ZINC 15 PO) Zinc Active December 1st, 2021 2:19pm       No current facility-administered medications for this visit. Review of Systems  Constitutional: No fever, no chills, no diaphoresis, no generalized weakness. HEENT: No blurred vision, No sore throat, no ear pain, no hair loss  Neck: denied any neck swelling, difficulty swallowing,   Cadrdiopulomary: No CP, SOB or palpitation, No orthopnea or PND. No cough or wheezing. GI: No N/V/D, no constipation, No abdominal pain, no melena or hematochezia   : Denied any dysuria, hematuria, flank pain, discharge, or incontinence. Skin: denied any rash, ulcer, Hirsute, or hyperpigmentation. MSK: denied any joint deformity, joint pain/swelling, muscle pain, or back pain. Neuro: no numbess, no tingling, no weakness,     OBJECTIVE    /68   Pulse 58   Ht 5' 5\" (1.651 m)   Wt 132 lb (59.9 kg)   BMI 21.97 kg/m²   BP Readings from Last 4 Encounters:   05/23/22 132/68   05/28/21 134/64   05/27/21 (!) 184/99     Wt Readings from Last 6 Encounters:   05/23/22 132 lb (59.9 kg)   05/27/21 130 lb (59 kg)       Physical examination:  General: awake alert, oriented x3, no abnormal position or movements. HEENT: normocephalic non traumatic  Neck: supple, no LN enlargement, s/p Rt thyroid lobectomy, no JVD. Pulm: Clear equal air entry no added sounds, no wheezing or rhonchi    CVS: S1 + S2, no murmur, no heave.  Dorsalis pedis pulse palpable   Abd: soft lax, no tenderness, no organomegaly, audible bowel sounds. Skin: warm, no lesions, no rash. Musculoskeletal: No back tenderness, no kyphosis/scoliosis   Neuro: CN intact, sensation normal , muscle power normal.  Psych: normal mood, and affect    Review of Laboratory Data:  I have reviewed the following:  Lab Results   Component Value Date/Time    WBC 4.9 05/26/2021 08:30 AM    RBC 4.17 05/26/2021 08:30 AM    HGB 13.5 05/26/2021 08:30 AM    HCT 39.1 05/26/2021 08:30 AM    MCV 93.8 05/26/2021 08:30 AM    MCH 32.4 05/26/2021 08:30 AM    MCHC 34.5 05/26/2021 08:30 AM    RDW 12.3 05/26/2021 08:30 AM     05/26/2021 08:30 AM    MPV 9.0 05/26/2021 08:30 AM      Lab Results   Component Value Date/Time     05/26/2021 08:30 AM    K 4.5 05/26/2021 08:30 AM    CO2 29 05/26/2021 08:30 AM    BUN 16 05/26/2021 08:30 AM    CREATININE 0.8 05/26/2021 08:30 AM    CALCIUM 8.4 (L) 05/28/2021 06:09 AM    LABGLOM >60 05/26/2021 08:30 AM    GFRAA >60 05/26/2021 08:30 AM      No results found for: TSH, T4FREE, A8OBPOV, FT3, L7AIUGR, TSI, TPOABS, THGAB  Lab Results   Component Value Date    GLUCOSE 108 05/26/2021     No results found for: TRIG, HDL, LDLCALC, CHOL  No results found for: VITD25    ASSESSMENT & RECOMMENDATIONS   Juliette Perez, a 58 y.o.-old female seen in for following issues     Papillary thyroid micro-carcinoma   · There was no high risk features identified in her pathology. · Excellent prognosis   · Currently not on thyroid medications. Goal to keep TSH 0.3-2     Postsurgical hypothyroidism:  · Currently not on thyroid medications   · Goal to keep her TSH b/w -0.3-2   · Check TFT now and proceed accordingly     I personally reviewed external notes from PCP and other patient's care team providers, and personally interpreted labs associated with the above diagnosis. I also ordered labs to further assess and manage the above addressed medical conditions.      Return in about 6 months (around 11/23/2022) for thyroid cancer, hypothyroidism, VitD deficiency. The above issues were reviewed with the patient who understood and agreed with the plan. Thank you for allowing us to participate in the care of this patient. Please do not hesitate to contact us with any additional questions. Diagnosis Orders   1. Thyroid cancer (HCC)  TSH    T4, Free    Thyroid Peroxidase Antibody    Thyroglobulin and anti-Thyroglobulin AB   2. Subclinical hypothyroidism  TSH    T4, Free     Dewitte Alpers MD  Endocrinologist, [facility] Diabetes [facility] and Endocrinology   [address] [place] [address]   Phone: [phone]  Fax: [phone]  ------------------------------  An electronic signature was used to authenticate this note.  Dave Hamilton MD on 5/23/2022 at 3:38 PM

## 2022-05-25 LAB
T4 FREE: 1
TSH SERPL DL<=0.05 MIU/L-ACNC: 4.42 UIU/ML

## 2022-05-26 ENCOUNTER — TELEPHONE (OUTPATIENT)
Dept: ENDOCRINOLOGY | Age: 62
End: 2022-05-26

## 2022-05-26 DIAGNOSIS — E03.8 SUBCLINICAL HYPOTHYROIDISM: ICD-10-CM

## 2022-05-26 DIAGNOSIS — C73 THYROID CANCER (HCC): Primary | ICD-10-CM

## 2022-05-26 DIAGNOSIS — C73 THYROID CANCER (HCC): ICD-10-CM

## 2022-05-27 NOTE — TELEPHONE ENCOUNTER
Notify pt,  I have reviewed your recent results    Thyroid hormones are normal but below goal for thyroid cancer.  I strongly recommend starting small dose (25 mcg) daily of levothyroxine and recheck level in 8 weeks

## 2022-06-02 DIAGNOSIS — E03.8 SUBCLINICAL HYPOTHYROIDISM: ICD-10-CM

## 2022-06-02 DIAGNOSIS — C73 THYROID CANCER (HCC): Primary | ICD-10-CM

## 2022-06-02 RX ORDER — LEVOTHYROXINE SODIUM 0.03 MG/1
25 TABLET ORAL DAILY
Qty: 90 TABLET | Refills: 3 | Status: SHIPPED
Start: 2022-06-02 | End: 2022-07-19 | Stop reason: SDUPTHER

## 2022-07-18 LAB
T4 FREE: 1.1
TSH SERPL DL<=0.05 MIU/L-ACNC: 3.52 UIU/ML

## 2022-07-19 ENCOUNTER — TELEPHONE (OUTPATIENT)
Dept: ENDOCRINOLOGY | Age: 62
End: 2022-07-19

## 2022-07-19 DIAGNOSIS — C73 THYROID CANCER (HCC): ICD-10-CM

## 2022-07-19 DIAGNOSIS — E03.8 SUBCLINICAL HYPOTHYROIDISM: ICD-10-CM

## 2022-07-19 RX ORDER — LEVOTHYROXINE SODIUM 0.03 MG/1
25 TABLET ORAL DAILY
Qty: 102 TABLET | Refills: 3 | Status: SHIPPED | OUTPATIENT
Start: 2022-07-19

## 2022-07-19 NOTE — TELEPHONE ENCOUNTER
I have reviewed thyroid function testing.   Please have patient increase dose of levothyroxine to 5 mcg 1 tablet Monday through Saturday, 2 tablets on Sunday

## 2022-07-20 DIAGNOSIS — C73 THYROID CANCER (HCC): Primary | ICD-10-CM

## 2022-10-04 LAB
T4 FREE: 1
TSH SERPL DL<=0.05 MIU/L-ACNC: 3.02 UIU/ML

## 2022-10-06 DIAGNOSIS — C73 THYROID CANCER (HCC): ICD-10-CM

## 2022-11-23 ENCOUNTER — OFFICE VISIT (OUTPATIENT)
Dept: ENDOCRINOLOGY | Age: 62
End: 2022-11-23
Payer: COMMERCIAL

## 2022-11-23 VITALS
BODY MASS INDEX: 22.33 KG/M2 | RESPIRATION RATE: 18 BRPM | SYSTOLIC BLOOD PRESSURE: 145 MMHG | DIASTOLIC BLOOD PRESSURE: 71 MMHG | HEART RATE: 64 BPM | WEIGHT: 134 LBS | OXYGEN SATURATION: 100 % | HEIGHT: 65 IN

## 2022-11-23 DIAGNOSIS — C73 THYROID CANCER (HCC): Primary | ICD-10-CM

## 2022-11-23 DIAGNOSIS — E03.8 SUBCLINICAL HYPOTHYROIDISM: ICD-10-CM

## 2022-11-23 PROCEDURE — 99214 OFFICE O/P EST MOD 30 MIN: CPT | Performed by: INTERNAL MEDICINE

## 2022-11-23 RX ORDER — LEVOTHYROXINE SODIUM 0.05 MG/1
50 TABLET ORAL DAILY
Qty: 30 TABLET | Refills: 5 | Status: SHIPPED | OUTPATIENT
Start: 2022-11-23

## 2022-11-23 RX ORDER — MELOXICAM 15 MG/1
TABLET ORAL
COMMUNITY
Start: 2022-11-19

## 2022-11-23 NOTE — PROGRESS NOTES
[address] [place] [address] Department of Endocrinology Diabetes and Metabolism   [address] [facility] [address]   Phone: [phone]  Fax: [phone]    Date of Service: 11/23/2022  Primary Care Physician: Jolynn Smith MD  Provider: Azeb Sauceda MD        Reason for the visit:  Primary Hypothyroidism    History of Present Illness: The history is provided by the patient. No  was used. Accuracy of the patient data is excellent. Aiden Miramontes is a very pleasant 58 y.o. 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 No family history of thyroid cancer. No prior history of radiation to head or neck region. LEVOTHYROXINE 25 MCG daily       PAST MEDICAL HISTORY   Past Medical History:   Diagnosis Date    Abnormal thyroid biopsy     History of diarrhea     Ovarian cancer ([facility] [address]     tx with chemo and radiation    Wears contact lenses     Wears glasses     Wears partial dentures     upper and lower       PAST SURGICAL HISTORY   Past Surgical History:   Procedure Laterality Date    THYROIDECTOMY N/A 5/27/2021    RIGHT SUBTOTAL THYROIDECTOMY performed by Bala Mark MD at [facility]   Tobacco:   reports that she has quit smoking. Her smoking use included cigarettes. She has never used smokeless tobacco.  Alcohol:   reports current alcohol use. Drugs:   reports no history of drug use. FAMILY HISTORY   No family history on file.     ALLERGIES AND DRUG REACTIONS   No Known Allergies    CURRENT MEDICATIONS   Current Outpatient Medications   Medication Sig Dispense Refill    meloxicam (MOBIC) 15 MG tablet       levothyroxine (SYNTHROID) 25 MCG tablet Take 1 tablet by mouth in the morning. Take 1 tablet by mouth Monday thru Saturday, 2 tablets on Sunday. 102 tablet 3    ascorbic acid (VITAMIN C) 100 MG tablet Vitamin C Active Daily December 1st, 2021 2:19pm      B Complex Vitamins (VITAMIN B COMPLEX PO) Vitamin B Complex Active Daily December 1st, 2021 2:19pm      Cholecalciferol (VITAMIN D) 50 MCG (2000 UT) CAPS capsule Vitamin D Active Daily December 1st, 2021 2:19pm      CALCIUM PO Calcium Active Daily December 1st, 2021 2:19pm      Zinc Sulfate (ZINC 15 PO) Zinc Active December 1st, 2021 2:19pm       No current facility-administered medications for this visit. Review of Systems  Constitutional: No fever, no chills, no diaphoresis, no generalized weakness. HEENT: No blurred vision, No sore throat, no ear pain, no hair loss  Neck: denied any neck swelling, difficulty swallowing,   Cadrdiopulomary: No CP, SOB or palpitation, No orthopnea or PND. No cough or wheezing. GI: No N/V/D, no constipation, No abdominal pain, no melena or hematochezia   : Denied any dysuria, hematuria, flank pain, discharge, or incontinence. Skin: denied any rash, ulcer, Hirsute, or hyperpigmentation. MSK: denied any joint deformity, joint pain/swelling, muscle pain, or back pain. Neuro: no numbess, no tingling, no weakness,     OBJECTIVE    BP (!) 145/71   Pulse 64   Resp 18   Ht 5' 5\" (1.651 m)   Wt 134 lb (60.8 kg)   SpO2 100%   BMI 22.30 kg/m²   BP Readings from Last 4 Encounters:   11/23/22 (!) 145/71   05/23/22 132/68   05/28/21 134/64   05/27/21 (!) 184/99     Wt Readings from Last 6 Encounters:   11/23/22 134 lb (60.8 kg)   05/23/22 132 lb (59.9 kg)   05/27/21 130 lb (59 kg)       Physical examination:  General: awake alert, oriented x3, no abnormal position or movements.    HEENT: normocephalic non traumatic  Neck: supple, no LN enlargement, s/p Rt thyroid lobectomy, no JVD. Pulm: Clear equal air entry no added sounds, no wheezing or rhonchi    CVS: S1 + S2, no murmur, no heave. Dorsalis pedis pulse palpable   Abd: soft lax, no tenderness, no organomegaly, audible bowel sounds. Skin: warm, no lesions, no rash. Musculoskeletal: No back tenderness, no kyphosis/scoliosis   Neuro: CN intact, sensation normal , muscle power normal.  Psych: normal mood, and affect    Review of Laboratory Data:  I have reviewed the following:  Lab Results   Component Value Date/Time    WBC 4.9 05/26/2021 08:30 AM    RBC 4.17 05/26/2021 08:30 AM    HGB 13.5 05/26/2021 08:30 AM    HCT 39.1 05/26/2021 08:30 AM    MCV 93.8 05/26/2021 08:30 AM    MCH 32.4 05/26/2021 08:30 AM    MCHC 34.5 05/26/2021 08:30 AM    RDW 12.3 05/26/2021 08:30 AM     05/26/2021 08:30 AM    MPV 9.0 05/26/2021 08:30 AM      Lab Results   Component Value Date/Time     05/26/2021 08:30 AM    K 4.5 05/26/2021 08:30 AM    CO2 29 05/26/2021 08:30 AM    BUN 16 05/26/2021 08:30 AM    CREATININE 0.8 05/26/2021 08:30 AM    CALCIUM 8.4 (L) 05/28/2021 06:09 AM    LABGLOM >60 05/26/2021 08:30 AM    GFRAA >60 05/26/2021 08:30 AM      Lab Results   Component Value Date/Time    TSH 3.02 10/04/2022 12:00 AM    T4FREE 1.0 10/04/2022 12:00 AM     Lab Results   Component Value Date/Time    GLUCOSE 108 05/26/2021 08:30 AM     No results found for: TRIG, HDL, LDLCALC, CHOL  No results found for: VITD25    ASSESSMENT & RECOMMENDATIONS   Juliette Perez, a 58 y.o.-old female seen in for following issues     Papillary thyroid micro-carcinoma   There was no high risk features identified in her pathology.    Excellent prognosis   Change Levothyroxine to 25 mcg 1 tab 6 days a week and 2 tab on Sunday  Goal to keep TSH 0.3-2     Postsurgical hypothyroidism:  Currently not on thyroid medications   Goal to keep her TSH b/w -0.3-2   Check TFT now and proceed accordingly     I personally reviewed external notes from PCP and other patient's care team providers, and personally interpreted labs associated with the above diagnosis. I also ordered labs to further assess and manage the above addressed medical conditions. Return in about 8 months (around 7/23/2023) for hypothyroidism, thyroid cancer. The above issues were reviewed with the patient who understood and agreed with the plan. Thank you for allowing us to participate in the care of this patient. Please do not hesitate to contact us with any additional questions. Diagnosis Orders   1. Thyroid cancer ([address]  levothyroxine (SYNTHROID) 50 MCG tablet    TSH    T4, Free      2. Subclinical hypothyroidism          Dierdre Seip MD  Endocrinologist, [facility] Diabetes Care and Endocrinology   [address] [place], [place] [address]   Phone: [phone]  Fax: [phone]  ------------------------------  An electronic signature was used to authenticate this note.  Richard Mitchell MD on 11/23/2022 at 1:39 PM

## 2023-01-17 LAB
T4 FREE: 1.1
TSH SERPL DL<=0.05 MIU/L-ACNC: 2.48 UIU/ML

## 2023-01-19 ENCOUNTER — TELEPHONE (OUTPATIENT)
Dept: ENDOCRINOLOGY | Age: 63
End: 2023-01-19

## 2023-01-19 DIAGNOSIS — C73 THYROID CANCER (HCC): Primary | ICD-10-CM

## 2023-01-19 DIAGNOSIS — E03.8 SUBCLINICAL HYPOTHYROIDISM: ICD-10-CM

## 2023-01-20 NOTE — TELEPHONE ENCOUNTER
Great!, thyroid hormones results are at goal There is no need for a change in your therapy at this time.

## 2023-05-15 DIAGNOSIS — C73 THYROID CANCER (HCC): ICD-10-CM

## 2023-05-15 RX ORDER — LEVOTHYROXINE SODIUM 0.05 MG/1
50 TABLET ORAL DAILY
Qty: 90 TABLET | Refills: 3 | Status: SHIPPED | OUTPATIENT
Start: 2023-05-15

## 2023-07-10 LAB
T4 FREE: 1
TSH SERPL DL<=0.05 MIU/L-ACNC: 2.38 UIU/ML

## 2023-07-19 DIAGNOSIS — C73 THYROID CANCER (HCC): ICD-10-CM

## 2023-07-31 ENCOUNTER — OFFICE VISIT (OUTPATIENT)
Dept: ENDOCRINOLOGY | Age: 63
End: 2023-07-31
Payer: COMMERCIAL

## 2023-07-31 VITALS
RESPIRATION RATE: 18 BRPM | WEIGHT: 131 LBS | HEART RATE: 60 BPM | BODY MASS INDEX: 21.83 KG/M2 | DIASTOLIC BLOOD PRESSURE: 74 MMHG | SYSTOLIC BLOOD PRESSURE: 169 MMHG | OXYGEN SATURATION: 99 % | HEIGHT: 65 IN

## 2023-07-31 DIAGNOSIS — C73 THYROID CANCER (HCC): Primary | ICD-10-CM

## 2023-07-31 DIAGNOSIS — E89.0 POSTOPERATIVE HYPOTHYROIDISM: ICD-10-CM

## 2023-07-31 PROCEDURE — 99214 OFFICE O/P EST MOD 30 MIN: CPT | Performed by: INTERNAL MEDICINE

## 2023-07-31 RX ORDER — LEVOTHYROXINE SODIUM 0.05 MG/1
50 TABLET ORAL DAILY
Qty: 90 TABLET | Refills: 3 | Status: SHIPPED | OUTPATIENT
Start: 2023-07-31

## 2023-07-31 NOTE — PROGRESS NOTES
[address] [facility] Department of Endocrinology Diabetes and Metabolism   [facility] [address]   Phone: [phone]  Fax: [phone]    Date of Service: 7/31/2023  Primary Care Physician: Chucky Valladares MD  Provider: Elton Dyson MD        Reason for the visit:  Primary Hypothyroidism    History of Present Illness: The history is provided by the patient. No  was used. Accuracy of the patient data is excellent. Len Draper is a very pleasant 61 y.o. 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 She takes levothyroxine in the morning, on empty stomach, 1 hour before she eats the breakfast.    Juliette Perez denies any new lumps, bumps in the neck, voice change, or shortness of breath. No family history of thyroid cancer. No prior history of radiation to head or neck region. Levothyrxine 50 mcg daily. Pt takes  levothyroxine in the morning on an empty stomach, waiting one hour before eating, avoiding multivitamins containing calcium or iron with it      PAST MEDICAL HISTORY   Past Medical History:   Diagnosis Date    Abnormal thyroid biopsy     History of diarrhea     Ovarian cancer ([address])     tx with chemo and radiation    Wears contact lenses     Wears glasses     Wears partial dentures     upper and lower       PAST SURGICAL HISTORY   Past Surgical History:   Procedure Laterality Date    THYROIDECTOMY N/A 5/27/2021    RIGHT SUBTOTAL THYROIDECTOMY performed by Adela Mcgill., MD at [address]   Tobacco:   reports that she has quit smoking. Her smoking use included cigarettes.  She has never used smokeless tobacco.  Alcohol:

## 2023-08-03 ENCOUNTER — HOSPITAL ENCOUNTER (OUTPATIENT)
Dept: ULTRASOUND IMAGING | Age: 63
Discharge: HOME | End: 2023-08-05
Payer: COMMERCIAL

## 2023-08-03 DIAGNOSIS — E04.1 NONTOXIC SINGLE THYROID NODULE: ICD-10-CM

## 2023-08-03 PROCEDURE — 10005 FNA BX W/US GDN 1ST LES: CPT

## 2023-08-03 PROCEDURE — 2500000003 HC RX 250 WO HCPCS: Performed by: RADIOLOGY

## 2023-08-03 RX ADMIN — Medication 4 ML: at 13:54

## 2023-08-07 LAB
NON-GYN CYTOLOGY REPORT: NORMAL
SEND OUT REPORT: NORMAL
TEST NAME: NORMAL

## 2023-12-13 ENCOUNTER — HOSPITAL ENCOUNTER (OUTPATIENT)
Age: 63
Discharge: HOME | End: 2023-12-15

## 2024-01-22 LAB
T4 FREE: 1.1
TSH SERPL DL<=0.05 MIU/L-ACNC: 2.02 UIU/ML

## 2024-01-24 DIAGNOSIS — C73 THYROID CANCER (HCC): ICD-10-CM

## 2024-01-25 DIAGNOSIS — C73 THYROID CANCER (HCC): ICD-10-CM

## 2024-01-28 RX ORDER — LEVOTHYROXINE SODIUM 0.05 MG/1
50 TABLET ORAL DAILY
Qty: 90 TABLET | Refills: 3 | Status: SHIPPED | OUTPATIENT
Start: 2024-01-28

## 2024-07-31 ENCOUNTER — OFFICE VISIT (OUTPATIENT)
Dept: ENDOCRINOLOGY | Age: 64
End: 2024-07-31
Payer: COMMERCIAL

## 2024-07-31 VITALS
DIASTOLIC BLOOD PRESSURE: 69 MMHG | HEIGHT: 65 IN | BODY MASS INDEX: 21.49 KG/M2 | WEIGHT: 129 LBS | SYSTOLIC BLOOD PRESSURE: 138 MMHG | OXYGEN SATURATION: 100 % | HEART RATE: 54 BPM

## 2024-07-31 DIAGNOSIS — C73 THYROID CANCER (HCC): Primary | ICD-10-CM

## 2024-07-31 DIAGNOSIS — E89.0 POSTOPERATIVE HYPOTHYROIDISM: ICD-10-CM

## 2024-07-31 LAB
T4 FREE: 1.1
TSH SERPL DL<=0.05 MIU/L-ACNC: 1.82 UIU/ML

## 2024-07-31 PROCEDURE — 99214 OFFICE O/P EST MOD 30 MIN: CPT | Performed by: CLINICAL NURSE SPECIALIST

## 2024-07-31 NOTE — PROGRESS NOTES
[employer]  [facility] Department of Endocrinology Diabetes and Metabolism   [address] [facility] [address], [place], [place], [address]   Phone: [phone]  Fax: [phone]    Date of Service: 7/31/2024  Primary Care Physician: Osiris Cano MD  Provider: SIRISHA Gregorio - CNS       Reason for the visit:  Primary Hypothyroidism    History of Present Illness:  The history is provided by the patient. No  was used. Accuracy of the patient data is excellent.         Juliette Perez is a very pleasant 64 y.o. 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  She takes levothyroxine in the morning, on empty stomach, 1 hour before she eats the breakfast.    Juliette Perez denies any new lumps, bumps in the neck, voice change, or shortness of breath. No family history of thyroid cancer. No prior history of radiation to head or neck region.    Levothyrxine 50 mcg daily. Pt takes  levothyroxine in the morning on an empty stomach, waiting one hour before eating, avoiding multivitamins containing calcium or iron with it    (1/24)Thyroglobulin 9.7, ab negative , TSH 2.02, FT4 1.1     Thyroid ultrasound showed 7/24 status post right thyroidectomy.  Again noted 1.2 x 1.2 x 0.4 cm soft tissue density in the right thyroid bed unchanged from prior study.  The left lobe measures 4.6 x 1.5 x 1.3 cm, isthmus 1 mm.  Benign 4 mm thyroid nodule noted    PAST MEDICAL HISTORY   Past Medical History:   Diagnosis Date    Abnormal thyroid biopsy     History of diarrhea     Ovarian cancer (HCC)     tx with chemo and radiation    Wears contact lenses     Wears glasses     Wears partial dentures     upper and lower       PAST SURGICAL

## 2024-08-01 DIAGNOSIS — C73 THYROID CANCER (HCC): ICD-10-CM

## 2024-08-01 DIAGNOSIS — E89.0 POSTOPERATIVE HYPOTHYROIDISM: ICD-10-CM

## 2024-08-07 ENCOUNTER — TELEPHONE (OUTPATIENT)
Dept: ENDOCRINOLOGY | Age: 64
End: 2024-08-07

## 2024-08-07 NOTE — TELEPHONE ENCOUNTER
----- Message from SIRISHA Gregorio - CNS sent at 8/7/2024  8:40 AM EDT -----  Please call patient and inform her I have reviewed labs.  Thyroid function is at goal.  Thyroglobulin is stable when compared to previous.  Will continue to observe

## 2025-01-08 DIAGNOSIS — C73 THYROID CANCER (HCC): Primary | ICD-10-CM

## 2025-01-08 DIAGNOSIS — E89.0 POSTOPERATIVE HYPOTHYROIDISM: ICD-10-CM

## 2025-01-14 LAB
T4 FREE: 1.1
TSH SERPL DL<=0.05 MIU/L-ACNC: 1.88 UIU/ML

## 2025-01-16 ENCOUNTER — TELEPHONE (OUTPATIENT)
Dept: ENDOCRINOLOGY | Age: 65
End: 2025-01-16

## 2025-01-16 DIAGNOSIS — C73 THYROID CANCER (HCC): ICD-10-CM

## 2025-01-16 DIAGNOSIS — E89.0 POSTOPERATIVE HYPOTHYROIDISM: ICD-10-CM

## 2025-01-16 NOTE — TELEPHONE ENCOUNTER
----- Message from Matthew BARBOSA sent at 1/16/2025  3:25 PM EST -----  Please call patient and inform her we have reviewed labs and will discuss with her at upcoming appointment

## 2025-01-27 ENCOUNTER — OFFICE VISIT (OUTPATIENT)
Dept: ENDOCRINOLOGY | Age: 65
End: 2025-01-27

## 2025-01-27 VITALS
RESPIRATION RATE: 18 BRPM | OXYGEN SATURATION: 100 % | SYSTOLIC BLOOD PRESSURE: 152 MMHG | HEIGHT: 65 IN | DIASTOLIC BLOOD PRESSURE: 81 MMHG | BODY MASS INDEX: 22.33 KG/M2 | WEIGHT: 134 LBS | HEART RATE: 55 BPM | TEMPERATURE: 98.4 F

## 2025-01-27 DIAGNOSIS — C73 THYROID CANCER (HCC): ICD-10-CM

## 2025-01-27 DIAGNOSIS — E89.0 POSTOPERATIVE HYPOTHYROIDISM: Primary | ICD-10-CM

## 2025-01-27 RX ORDER — LEVOTHYROXINE SODIUM 50 UG/1
50 TABLET ORAL DAILY
Qty: 90 TABLET | Refills: 3 | Status: SHIPPED | OUTPATIENT
Start: 2025-01-27

## 2025-01-27 NOTE — PROGRESS NOTES
[facility] Department of Endocrinology Diabetes and Metabolism   [address] [place] [address]   Phone: [phone]  Fax: [phone]    Date of Service: 1/27/2025  Primary Care Physician: Osiris Cano MD  Provider: Shivam Che MD        Reason for the visit:  Primary Hypothyroidism    History of Present Illness:  The history is provided by the patient. No  was used. Accuracy of the patient data is excellent.         Juliette Perez is a very pleasant 64 y.o. 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  She takes levothyroxine in the morning, on empty stomach, 1 hour before she eats the breakfast.    Most recent thyroid blood work from January 14, 2025 showed a normal TSH is 1.80  Juliette Perez denies any new lumps, bumps in the neck, voice change, or shortness of breath. No family history of thyroid cancer. No prior history of radiation to head or neck region.    PAST MEDICAL HISTORY   Past Medical History:   Diagnosis Date    Abnormal thyroid biopsy     History of diarrhea     Ovarian cancer (HCC)     tx with chemo and radiation    Wears contact lenses     Wears glasses     Wears partial dentures     upper and lower       PAST SURGICAL HISTORY   Past Surgical History:   Procedure Laterality Date    THYROIDECTOMY N/A 5/27/2021    RIGHT SUBTOTAL THYROIDECTOMY performed by Julio Vickers Jr., MD at [facility] OR    TONSILLECTOMY         SOCIAL HISTORY   Tobacco:   reports that she has quit smoking. Her smoking use included cigarettes. She has never used smokeless tobacco.  Alcohol:   reports current alcohol use.  Drugs:   reports no history of drug use.    FAMILY HISTORY   No family

## 2025-06-19 DIAGNOSIS — C73 THYROID CANCER (HCC): ICD-10-CM

## 2025-06-24 ENCOUNTER — RESULTS FOLLOW-UP (OUTPATIENT)
Dept: ENDOCRINOLOGY | Age: 65
End: 2025-06-24

## 2025-06-24 NOTE — TELEPHONE ENCOUNTER
----- Message from Matthew BARBOSA sent at 6/24/2025  8:34 AM EDT -----  Please call patient and inform her I have reviewed thyroid ultrasound.  Thyroid ultrasound is stable when compared to previous.  Soft tissue noted superior thyroid bed unchanged.  Will continue to observe.  Will discuss further at next office visit

## (undated) DEVICE — MASTISOL ADHESIVE LIQ 2/3ML

## (undated) DEVICE — ELECTRODE ELECSURG NDL 2.8 INX7.2 CM COAT INSUL EDGE

## (undated) DEVICE — 4-PORT MANIFOLD: Brand: NEPTUNE 2

## (undated) DEVICE — INSTRUMENT HARMONIC CORD BLUE REUSABLE

## (undated) DEVICE — EMG TUBE 8229707 NIM TRIVANTAGE 7.0MM ID: Brand: NIM TRIVANTAGE™

## (undated) DEVICE — GLOVE ORANGE PI 7 1/2   MSG9075

## (undated) DEVICE — CORD,CAUTERY,BIPOLAR,STERILE: Brand: MEDLINE

## (undated) DEVICE — TOWEL,OR,DSP,ST,BLUE,STD,6/PK,12PK/CS: Brand: MEDLINE

## (undated) DEVICE — SPONGE,PEANUT,XRAY,ST,SM,3/8",5/CARD: Brand: MEDLINE INDUSTRIES, INC.

## (undated) DEVICE — STRIP,CLOSURE,WOUND,MEDI-STRIP,1/2X4: Brand: MEDLINE

## (undated) DEVICE — READY WET SKIN SCRUB TRAY-LF: Brand: MEDLINE INDUSTRIES, INC.

## (undated) DEVICE — WOUND RETRACTOR AND PROTECTOR: Brand: ALEXIS O WOUND PROTECTOR-RETRACTOR

## (undated) DEVICE — PACK PROCEDURE SURG GEN CUST

## (undated) DEVICE — INTENDED FOR TISSUE SEPARATION, AND OTHER PROCEDURES THAT REQUIRE A SHARP SURGICAL BLADE TO PUNCTURE OR CUT.: Brand: BARD-PARKER ® STAINLESS STEEL BLADES

## (undated) DEVICE — BAG: SPONGE CT 10.25X32 2.0ML BLU 250/CS: Brand: MEDICAL ACTION INDUSTRIES

## (undated) DEVICE — ELECTRODE PT RET AD L9FT HI MOIST COND ADH HYDRGEL CORDED

## (undated) DEVICE — PROBE 8225101 5PK STD PRASS FL TIP ROHS

## (undated) DEVICE — EXTRAS THYROID

## (undated) DEVICE — NEEDLE HYPO 25GA L1.5IN BLU POLYPR HUB S STL REG BVL STR

## (undated) DEVICE — BLADE ES ELASTOMERIC COAT INSUL DURABLE BEND UPTO 90DEG

## (undated) DEVICE — ADHESIVE SKIN CLSR 0.7ML TOP DERMBND ADV

## (undated) DEVICE — COVER HNDL LT DISP

## (undated) DEVICE — WOUND RETRACTOR AND PROTECTOR: Brand: ALEXIS WOUND PROTECTOR-RETRACTOR

## (undated) DEVICE — CONTROL SYRINGE LUER-LOCK TIP: Brand: MONOJECT

## (undated) DEVICE — JP CHAN DRN SIL RND 15FR FULL W/TRO: Brand: JACKSON-PRATT

## (undated) DEVICE — SET INSTR BABY LAP

## (undated) DEVICE — SUTURE MNCRYL STRATAFIX PS 4-0 30CM

## (undated) DEVICE — Device

## (undated) DEVICE — SET INSTR DISSECT ENT

## (undated) DEVICE — MAGNETIC INSTR DRAPE 20X16: Brand: MEDLINE INDUSTRIES, INC.

## (undated) DEVICE — SOLUTION IV IRRIG POUR BRL 0.9% SODIUM CHL 2F7124

## (undated) DEVICE — FORCEPS BIPOLAR BAYONET NEURO

## (undated) DEVICE — SURGICAL PROCEDURE PACK EENT CUST

## (undated) DEVICE — DOUBLE BASIN SET: Brand: MEDLINE INDUSTRIES, INC.